# Patient Record
Sex: FEMALE | Race: WHITE | NOT HISPANIC OR LATINO | Employment: FULL TIME
[De-identification: names, ages, dates, MRNs, and addresses within clinical notes are randomized per-mention and may not be internally consistent; named-entity substitution may affect disease eponyms.]

---

## 2023-01-04 ENCOUNTER — HOSPITAL ENCOUNTER (OUTPATIENT)
Dept: DATA CONVERSION | Age: 28
End: 2023-01-04

## 2023-04-25 ENCOUNTER — LAB (OUTPATIENT)
Dept: LAB | Facility: LAB | Age: 28
End: 2023-04-25
Payer: COMMERCIAL

## 2023-04-25 ENCOUNTER — OFFICE VISIT (OUTPATIENT)
Dept: PRIMARY CARE | Facility: CLINIC | Age: 28
End: 2023-04-25
Payer: COMMERCIAL

## 2023-04-25 VITALS
DIASTOLIC BLOOD PRESSURE: 88 MMHG | BODY MASS INDEX: 20.77 KG/M2 | TEMPERATURE: 97.5 F | WEIGHT: 121 LBS | SYSTOLIC BLOOD PRESSURE: 128 MMHG

## 2023-04-25 DIAGNOSIS — F41.9 ANXIETY: ICD-10-CM

## 2023-04-25 DIAGNOSIS — M25.50 ARTHRALGIA, UNSPECIFIED JOINT: ICD-10-CM

## 2023-04-25 DIAGNOSIS — R59.0 ENLARGED LYMPH NODE IN NECK: Primary | ICD-10-CM

## 2023-04-25 DIAGNOSIS — R59.0 ENLARGED LYMPH NODE IN NECK: ICD-10-CM

## 2023-04-25 LAB
BASOPHILS (10*3/UL) IN BLOOD BY AUTOMATED COUNT: 0.02 X10E9/L (ref 0–0.1)
BASOPHILS/100 LEUKOCYTES IN BLOOD BY AUTOMATED COUNT: 0.5 % (ref 0–2)
EOSINOPHILS (10*3/UL) IN BLOOD BY AUTOMATED COUNT: 0.06 X10E9/L (ref 0–0.7)
EOSINOPHILS/100 LEUKOCYTES IN BLOOD BY AUTOMATED COUNT: 1.5 % (ref 0–6)
ERYTHROCYTE DISTRIBUTION WIDTH (RATIO) BY AUTOMATED COUNT: 12.6 % (ref 11.5–14.5)
ERYTHROCYTE MEAN CORPUSCULAR HEMOGLOBIN CONCENTRATION (G/DL) BY AUTOMATED: 31.9 G/DL (ref 32–36)
ERYTHROCYTE MEAN CORPUSCULAR VOLUME (FL) BY AUTOMATED COUNT: 94 FL (ref 80–100)
ERYTHROCYTES (10*6/UL) IN BLOOD BY AUTOMATED COUNT: 4.54 X10E12/L (ref 4–5.2)
HEMATOCRIT (%) IN BLOOD BY AUTOMATED COUNT: 42.7 % (ref 36–46)
HEMOGLOBIN (G/DL) IN BLOOD: 13.6 G/DL (ref 12–16)
IMMATURE GRANULOCYTES/100 LEUKOCYTES IN BLOOD BY AUTOMATED COUNT: 0.2 % (ref 0–0.9)
LEUKOCYTES (10*3/UL) IN BLOOD BY AUTOMATED COUNT: 4.1 X10E9/L (ref 4.4–11.3)
LYMPHOCYTES (10*3/UL) IN BLOOD BY AUTOMATED COUNT: 1.66 X10E9/L (ref 1.2–4.8)
LYMPHOCYTES/100 LEUKOCYTES IN BLOOD BY AUTOMATED COUNT: 40.9 % (ref 13–44)
MONOCYTES (10*3/UL) IN BLOOD BY AUTOMATED COUNT: 0.22 X10E9/L (ref 0.1–1)
MONOCYTES/100 LEUKOCYTES IN BLOOD BY AUTOMATED COUNT: 5.4 % (ref 2–10)
NEUTROPHILS (10*3/UL) IN BLOOD BY AUTOMATED COUNT: 2.09 X10E9/L (ref 1.2–7.7)
NEUTROPHILS/100 LEUKOCYTES IN BLOOD BY AUTOMATED COUNT: 51.5 % (ref 40–80)
PLATELETS (10*3/UL) IN BLOOD AUTOMATED COUNT: 192 X10E9/L (ref 150–450)
RHEUMATOID FACTOR (IU/ML) IN SERUM OR PLASMA: <10 IU/ML (ref 0–15)

## 2023-04-25 PROCEDURE — 86038 ANTINUCLEAR ANTIBODIES: CPT

## 2023-04-25 PROCEDURE — 86225 DNA ANTIBODY NATIVE: CPT

## 2023-04-25 PROCEDURE — 86039 ANTINUCLEAR ANTIBODIES (ANA): CPT

## 2023-04-25 PROCEDURE — 36415 COLL VENOUS BLD VENIPUNCTURE: CPT

## 2023-04-25 PROCEDURE — 99214 OFFICE O/P EST MOD 30 MIN: CPT | Performed by: FAMILY MEDICINE

## 2023-04-25 PROCEDURE — 86431 RHEUMATOID FACTOR QUANT: CPT

## 2023-04-25 PROCEDURE — 85025 COMPLETE CBC W/AUTO DIFF WBC: CPT

## 2023-04-25 PROCEDURE — 86235 NUCLEAR ANTIGEN ANTIBODY: CPT

## 2023-04-25 RX ORDER — HYDROXYZINE HYDROCHLORIDE 10 MG/1
10 TABLET, FILM COATED ORAL NIGHTLY
COMMUNITY
Start: 2023-04-11 | End: 2024-04-08 | Stop reason: ALTCHOICE

## 2023-04-25 ASSESSMENT — PATIENT HEALTH QUESTIONNAIRE - PHQ9
SUM OF ALL RESPONSES TO PHQ9 QUESTIONS 1 AND 2: 2
1. LITTLE INTEREST OR PLEASURE IN DOING THINGS: SEVERAL DAYS
2. FEELING DOWN, DEPRESSED OR HOPELESS: SEVERAL DAYS

## 2023-04-25 ASSESSMENT — ENCOUNTER SYMPTOMS
ARTHRALGIAS: 1
NERVOUS/ANXIOUS: 1
FATIGUE: 1
FEVER: 1

## 2023-04-25 NOTE — PATIENT INSTRUCTIONS
[facility] - [place] OFFICE  [address], [place] [address] [phone]  8:30am - 8:00pm M-  8:30am - 5:00pm F
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
## 2023-04-26 LAB
ANA PATTERN: ABNORMAL
ANA TITER: ABNORMAL
ANTI-CENTROMERE: <0.2 AI
ANTI-CHROMATIN: <0.2 AI
ANTI-DNA (DS): 1 IU/ML
ANTI-JO-1 IGG: <0.2 AI
ANTI-NUCLEAR ANTIBODY (ANA): POSITIVE
ANTI-RIBOSOMAL P: <0.2 AI
ANTI-RNP: <0.2 AI
ANTI-SCL-70: <0.2 AI
ANTI-SM/RNP: <0.2 AI
ANTI-SM: <0.2 AI
ANTI-SSA: <0.2 AI
ANTI-SSB: <0.2 AI

## 2023-04-28 ENCOUNTER — TELEPHONE (OUTPATIENT)
Dept: PRIMARY CARE | Facility: CLINIC | Age: 28
End: 2023-04-28
Payer: COMMERCIAL

## 2023-04-28 NOTE — TELEPHONE ENCOUNTER
Please contact Kiah and let her know her lupus screen was positive, but the more specific tests were all negative, I am happy to place a referral to rheumatology, but think we can monitor and discuss at her follow up appt in 2 weeks,    Thank you,  Traci Huizar, DO

## 2023-05-09 ENCOUNTER — OFFICE VISIT (OUTPATIENT)
Dept: PRIMARY CARE | Facility: CLINIC | Age: 28
End: 2023-05-09
Payer: COMMERCIAL

## 2023-05-09 VITALS
DIASTOLIC BLOOD PRESSURE: 58 MMHG | BODY MASS INDEX: 19.57 KG/M2 | TEMPERATURE: 96.2 F | WEIGHT: 114 LBS | SYSTOLIC BLOOD PRESSURE: 112 MMHG

## 2023-05-09 DIAGNOSIS — M25.50 ARTHRALGIA, UNSPECIFIED JOINT: Primary | ICD-10-CM

## 2023-05-09 DIAGNOSIS — F41.9 ANXIETY: ICD-10-CM

## 2023-05-09 PROCEDURE — 99213 OFFICE O/P EST LOW 20 MIN: CPT | Performed by: FAMILY MEDICINE

## 2023-05-09 PROCEDURE — 1036F TOBACCO NON-USER: CPT | Performed by: FAMILY MEDICINE

## 2023-05-09 ASSESSMENT — ENCOUNTER SYMPTOMS
CONSTITUTIONAL NEGATIVE: 1
NERVOUS/ANXIOUS: 1
ARTHRALGIAS: 1
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
## 2023-06-26 ENCOUNTER — APPOINTMENT (OUTPATIENT)
Dept: PRIMARY CARE | Facility: CLINIC | Age: 28
End: 2023-06-26
Payer: COMMERCIAL

## 2023-06-28 ENCOUNTER — APPOINTMENT (OUTPATIENT)
Dept: PRIMARY CARE | Facility: CLINIC | Age: 28
End: 2023-06-28
Payer: COMMERCIAL

## 2023-06-28 ENCOUNTER — OFFICE VISIT (OUTPATIENT)
Dept: PRIMARY CARE | Facility: CLINIC | Age: 28
End: 2023-06-28
Payer: COMMERCIAL

## 2023-06-28 VITALS
HEART RATE: 117 BPM | SYSTOLIC BLOOD PRESSURE: 112 MMHG | WEIGHT: 123 LBS | BODY MASS INDEX: 21 KG/M2 | OXYGEN SATURATION: 96 % | HEIGHT: 64 IN | DIASTOLIC BLOOD PRESSURE: 82 MMHG | TEMPERATURE: 98 F

## 2023-06-28 DIAGNOSIS — T40.411A ACCIDENTAL FENTANYL OVERDOSE, INITIAL ENCOUNTER (MULTI): ICD-10-CM

## 2023-06-28 DIAGNOSIS — J69.0: Primary | ICD-10-CM

## 2023-06-28 PROBLEM — F11.90 OPIOID USE DISORDER: Status: ACTIVE | Noted: 2023-06-28

## 2023-06-28 PROCEDURE — 1036F TOBACCO NON-USER: CPT | Performed by: FAMILY MEDICINE

## 2023-06-28 PROCEDURE — 99204 OFFICE O/P NEW MOD 45 MIN: CPT | Performed by: FAMILY MEDICINE

## 2023-06-28 RX ORDER — HYDROXYZINE HYDROCHLORIDE 10 MG/1
10 TABLET, FILM COATED ORAL NIGHTLY
COMMUNITY
Start: 2023-06-05

## 2023-06-28 RX ORDER — BENZONATATE 100 MG/1
CAPSULE ORAL
COMMUNITY
Start: 2023-06-24 | End: 2023-06-28 | Stop reason: SDUPTHER

## 2023-06-28 RX ORDER — MIRTAZAPINE 7.5 MG/1
7.5 TABLET, FILM COATED ORAL NIGHTLY
COMMUNITY
Start: 2023-06-24

## 2023-06-28 RX ORDER — BENZONATATE 100 MG/1
CAPSULE ORAL
Qty: 30 CAPSULE | Refills: 0 | Status: SHIPPED | OUTPATIENT
Start: 2023-06-28 | End: 2024-04-08 | Stop reason: WASHOUT

## 2023-06-28 RX ORDER — AMOXICILLIN AND CLAVULANATE POTASSIUM 875; 125 MG/1; MG/1
1 TABLET, FILM COATED ORAL EVERY 12 HOURS
COMMUNITY
Start: 2023-06-24 | End: 2024-04-08 | Stop reason: ALTCHOICE

## 2023-06-28 ASSESSMENT — PAIN SCALES - GENERAL: PAINLEVEL: 0-NO PAIN

## 2023-06-28 NOTE — PROGRESS NOTES
"Subjective   Patient ID: Kiah Oliveros is a 28 y.o. female who presents for Follow-up (Follow up from hospital stay.).    HPI:  Fentanyl overdose-  Patient presented in the ER on Wednesday 21st. Patient states she has a mistakenly overdose with fentanyl. Patient's boyfriend stated she was not breathing and did not have a pulse. Boyfriend had to perform CPR for 5-10mins. Once ambulance arrived patient received 2 doses of narcan and transported to the hospital.   Patient stated used heroin for 2 months, was clean for 2 month prior to the overdose. She states only using heroin and only snorts. She lived with boyfriend for a year and moved out but now lives alone until she had her episode on Wednesday. She feels safe at home.  Patient still has dry cough for which she uses tessalon peals.  No fam hx of cancer, DM, HTN, drug use, heart conditions   No surgeries   Allergies doxycycline - after 4 days gets fever and joint pain   Never smoked, no alcohol use  Sexual active with 4 men and always uses protection. Patient is not on any form of contraception and does not want to discuss any today.    2. She had an multinodular bilateral pneumonia, predominantly basilar, suggesting non-acute aspiration      Medications reviewed and reconciled. Has Narcan from admission         Review of Systems:   No fever   No  chills, diaphoresis,   No rhinorrhea, lacrimation, sneezing,   No palpitations  +cough dry, in evening   No nausea, GI cramping, diarrhea,   No tremulousness      No symptoms suggestive of intoxication or sedation.        Objective   /82 (BP Location: Right arm, Patient Position: Sitting)   Pulse (!) 117   Temp 36.7 °C (98 °F) (Temporal)   Ht 1.626 m (5' 4\")   Wt 55.8 kg (123 lb)   SpO2 96%   BMI 21.11 kg/m²     Physical exam:  Well groomed, comfortable at rest.   Anxious, looks to partner   BP: 112/78,  reg   Eyes: TYRONE, normal pupils  ENT: No coryza  CV: Normal heart sounds, RRR  Lungs clear, no " wheeze or crackles  Fair AE; dry cough   Neuro: Tone, power symmetric, no tremor  Integuement: No diaphoresis, no rash,   Extremities: no leg edema      Assessment/Plan     Accidental fentanyl overdose  Opioid use disorder   Patient transported to the ED and admitted to the hospital 2/2 to fentanyl overdose on 6/21   -Patient offered counseling services for OUD   -Discussed Vivitrol and buprenorphine/naloxone as treatment options- patient will consider    -Narcan provided in the hospital       Aspiration pneumonia of both lower lobes due to vomit   Patient admitted to the hospital 2/2 to fentanyl over dose and aspiration pneumonia    -Reassurance was provided regarding pneumonia. No sign/symptoms of active infection.  Plan to confirm resolution at next visit       RTC in 4 weeks. Note written by Marce Warner and Michelle

## 2023-06-28 NOTE — PATIENT INSTRUCTIONS
Thanks for coming to the [facility] today.   The issues we addressed were:    Fentanyl overdose-   When you decide on a treatment program for support, call the one you prefer.  We suggest [facility] [phone] or the [facility], [phone].    If you would like to use some support medications, Vivitrol or buprenorphine/naloxone would both be good choices. We can prescribe the bup/naloxone here, or you can arrange either at the treatment program.    Pneumonia- has been cured by your medication, and by no longer using fentanyl/heroin.   Please call or go to the Emergency if those symptoms return ( cough, fever) because it's possible to get it twice.     Please see Dr. Martinez within 4 weeks for your health maintenance visit.     If you are sick at any time and need to be seen, contact us for advice.  Please use [facility] when you can- it works.

## 2023-07-03 ENCOUNTER — APPOINTMENT (OUTPATIENT)
Dept: PRIMARY CARE | Facility: CLINIC | Age: 28
End: 2023-07-03
Payer: COMMERCIAL

## 2024-04-04 NOTE — PROGRESS NOTES
Rheumatology Outpatient Follow Up Note    Subjective   Kiah Oliveros is a 29 y.o. female presenting today for Joint Pain.    History of Presenting Problem:   Rheum history:  She was referred to us by PCP for positive YVROSE. It was initially checked because she had recurrent episodes of fever (max ~100F ), fatigue, headache and joint pain(hands, wrists, shoulders, back, knees) but no swelling started in November 2022. Her episodes are not associated with skin rashes and no oral ulcers. No sore throat. No conjunctivitis. She also denies, malar rash, photosensitivity, skin rashes, dry eyes, dry mouth, seizures, h/o blood clots, h/o pericardial or pleural effusion, cold sensitivity in fingers, change in fingers colors when exposed to extreme temperatures, numbness, tingling or muscle weakness.  She has posterior cervical LAP that was first noted in 1/2023 and was seen by ENT who reassured her.   Reviewed labs 4/2023: YVROSE 1:160 speckled, CANDACE negative. RF negative. CBC with WBC 4.1 otherwise unremarkable. BMP normal in 4/2023     Interval history: She didn't have any recent flare ups since last visit. She has been have having more hand pain and stiffness, fatigue, hair loss and dryness of her eyes. She still thinks that her neck (posterior) lymph node is still enlarged but no painful. She overall doesn't feel like normal but also improving now.  No skin rashes. No oral ulcers and no fever episodes.    Past Medical History: History reviewed. No pertinent past medical history.    Allergies:   Allergies   Allergen Reactions    Doxycycline Fever    Lexapro [Escitalopram Oxalate] Fever       Medications:   Current Outpatient Medications:     benzonatate (Tessalon) 100 mg capsule, TAKE 1 CAPSULE BY MOUTH 3 TIMES A DAY AS NEEDED FOR COUGH, Disp: 30 capsule, Rfl: 0    hydrOXYzine HCL (Atarax) 10 mg tablet, Take 1 tablet (10 mg) by mouth once daily at bedtime., Disp: , Rfl:     mirtazapine (Remeron) 7.5 mg tablet, Take 1 tablet  "(7.5 mg) by mouth once daily at bedtime., Disp: , Rfl:     Review of Systems:   Constitutional: Denies fever, chills   Eyes: Denies dry eyes, pain in the eyes   ENT: Denies dry mouth, loss of taste, sores in the mouth  Cardiovascular: Denies chest pain, palpitations   Respiratory: Denies shortness of breath   Gastrointestinal: Denies heartburn   Integumentary: Denies photosensitivity, rash or lesions, Raynaud's   Neurological: Denies any numbness or tingling    MSK: As per HPI.     All 10 review of systems have been reviewed and are negative for complaint except as noted in the HPI    Objective   Physical Examination:  Vitals:    04/08/24 0823   BP: 109/78   Pulse: 89   Temp: 36.8 °C (98.2 °F)     Growth %ile SmartLinks can only be used for patients less than 20 years old.  Ht Readings from Last 1 Encounters:   04/08/24 1.626 m (5' 4\")     Wt Readings from Last 1 Encounters:   04/08/24 59 kg (130 lb)       General - NAD, sitting up in chair, well-groomed, pleasant, AAOx3  Head: Normocephalic, atraumatic  Eyes - PERRLA, EOMI. No conjunctiva injection.   Cardiovascular - Normal S1, S2. Regular rate and rhythm. No murmurs or rubs.  Lungs - Symmetric chest expansion. Clear to auscultation bilaterally.   Skin - No rashes or ulcers. Skin warm and dry. No erythema on bilateral cheeks.  Extremities - No edema, cyanosis ,or clubbing  Neurological - Alert and oriented x 3,  grossly intact. No focal deficit.  Musculoskeletal -  Shoulders: Full ROM, without pain, no swelling, warmth or tenderness.  Elbows: Full ROM, without pain, no swelling, warmth or tenderness.  Wrists: Full ROM, without pain, no swelling, warmth or tenderness.  MCP: No swelling, warmth or tenderness. Metacarpal squeeze negative  PIP: No swelling, warmth or tenderness.  DIP: No swelling, warmth or tenderness.  Hands : 5/5.    Sacroiliac joints: No local tenderness. XUAN negative.   Hips: Full ROM.  No malalignment.  Knees:  Full ROM, without pain, no " swelling, warmth or point tenderness.   Ankles: Full ROM, without pain, swelling, warmth or tenderness.  Toes:  Metatarsal squeeze negative  Cervical spine: No tenderness or limitation of movement  Lumbar spine: No tenderness or limitation of movement          Assessment/Plan   Kiah Oliveros is a 29 y.o. female with PMHx of depression and cervical reactive lymphadenopathy who is presenting today for follow up:     She had recurrent episodes of fever (max ~100F ), fatigue, headache and joint pain(hands, wrists, shoulders, back, knees) but no joint swelling. No sore throat and no mouth sores. She has 1 posterior cervical LAP but no diffuse LAP. Her exam was normal. Reviewed labs 4/2023: YVROSE 1:160 speckled, CANDACE negative. RF negative. CBC with WBC 4.1 otherwise unremarkable. BMP normal in 4/2023     Its unclear why she had but her fevers were low grade and not associated with oral ulcers, sore throat, conjunctivitis, LAP or other features of autoinflammatory febrile illnesses. She now has more hand stiffness, fatigue, hair loss and dry eyes. I am still considering the diagnosis of SLE/UCTD in my differential but would like to evaluate for other etiologies. I will recheck some of her serologies and refer to hematology for their advice of LAP.  Considering HCQ trial at next visit. RTC 2 months.    The assessment and plan, risk and benefits were discussed with the patient. All of the patients questions were answered and patient agrees to the plan.      Elia Lujan MD  Clinical   Department of Rheumatology   [facility]

## 2024-04-08 ENCOUNTER — LAB (OUTPATIENT)
Dept: LAB | Facility: LAB | Age: 29
End: 2024-04-08
Payer: COMMERCIAL

## 2024-04-08 ENCOUNTER — OFFICE VISIT (OUTPATIENT)
Dept: RHEUMATOLOGY | Facility: CLINIC | Age: 29
End: 2024-04-08
Payer: COMMERCIAL

## 2024-04-08 VITALS
HEIGHT: 64 IN | DIASTOLIC BLOOD PRESSURE: 78 MMHG | TEMPERATURE: 98.2 F | WEIGHT: 130 LBS | HEART RATE: 89 BPM | SYSTOLIC BLOOD PRESSURE: 109 MMHG | BODY MASS INDEX: 22.2 KG/M2

## 2024-04-08 DIAGNOSIS — R76.8 POSITIVE ANA (ANTINUCLEAR ANTIBODY): ICD-10-CM

## 2024-04-08 DIAGNOSIS — R59.0 REACTIVE CERVICAL LYMPHADENOPATHY: ICD-10-CM

## 2024-04-08 DIAGNOSIS — R76.8 POSITIVE ANA (ANTINUCLEAR ANTIBODY): Primary | ICD-10-CM

## 2024-04-08 LAB
ALBUMIN SERPL BCP-MCNC: 4.4 G/DL (ref 3.4–5)
ALP SERPL-CCNC: 61 U/L (ref 33–110)
ALT SERPL W P-5'-P-CCNC: 12 U/L (ref 7–45)
ANION GAP SERPL CALC-SCNC: 12 MMOL/L (ref 10–20)
APPEARANCE UR: CLEAR
AST SERPL W P-5'-P-CCNC: 15 U/L (ref 9–39)
B2 GLYCOPROT1 IGA SER-ACNC: <0.6 U/ML
B2 GLYCOPROT1 IGG SER-ACNC: <1.4 U/ML
B2 GLYCOPROT1 IGM SER-ACNC: 0.4 U/ML
BASOPHILS # BLD AUTO: 0.02 X10*3/UL (ref 0–0.1)
BASOPHILS NFR BLD AUTO: 0.5 %
BILIRUB SERPL-MCNC: 0.5 MG/DL (ref 0–1.2)
BILIRUB UR STRIP.AUTO-MCNC: NEGATIVE MG/DL
BUN SERPL-MCNC: 12 MG/DL (ref 6–23)
C3 SERPL-MCNC: 129 MG/DL (ref 87–200)
C4 SERPL-MCNC: 36 MG/DL (ref 10–50)
CALCIUM SERPL-MCNC: 10.3 MG/DL (ref 8.6–10.6)
CARDIOLIPIN IGA SERPL-ACNC: 0.6 APL U/ML
CARDIOLIPIN IGG SER IA-ACNC: <1.6 GPL U/ML
CARDIOLIPIN IGM SER IA-ACNC: 0.3 MPL U/ML
CHLORIDE SERPL-SCNC: 103 MMOL/L (ref 98–107)
CO2 SERPL-SCNC: 29 MMOL/L (ref 21–32)
COLOR UR: YELLOW
CREAT SERPL-MCNC: 0.6 MG/DL (ref 0.5–1.05)
CREAT UR-MCNC: 178.5 MG/DL (ref 20–320)
CRP SERPL-MCNC: <0.1 MG/DL
EGFRCR SERPLBLD CKD-EPI 2021: >90 ML/MIN/1.73M*2
EOSINOPHIL # BLD AUTO: 0.06 X10*3/UL (ref 0–0.7)
EOSINOPHIL NFR BLD AUTO: 1.5 %
ERYTHROCYTE [DISTWIDTH] IN BLOOD BY AUTOMATED COUNT: 12.1 % (ref 11.5–14.5)
ERYTHROCYTE [SEDIMENTATION RATE] IN BLOOD BY WESTERGREN METHOD: 3 MM/H (ref 0–20)
GLUCOSE SERPL-MCNC: 98 MG/DL (ref 74–99)
GLUCOSE UR STRIP.AUTO-MCNC: NORMAL MG/DL
HCT VFR BLD AUTO: 39.6 % (ref 36–46)
HGB BLD-MCNC: 12.5 G/DL (ref 12–16)
IGG4 SER-MCNC: 7 MG/DL (ref 3–200)
IMM GRANULOCYTES # BLD AUTO: 0 X10*3/UL (ref 0–0.7)
IMM GRANULOCYTES NFR BLD AUTO: 0 % (ref 0–0.9)
KETONES UR STRIP.AUTO-MCNC: NEGATIVE MG/DL
LEUKOCYTE ESTERASE UR QL STRIP.AUTO: NEGATIVE
LYMPHOCYTES # BLD AUTO: 1.95 X10*3/UL (ref 1.2–4.8)
LYMPHOCYTES NFR BLD AUTO: 48.8 %
MCH RBC QN AUTO: 29.6 PG (ref 26–34)
MCHC RBC AUTO-ENTMCNC: 31.6 G/DL (ref 32–36)
MCV RBC AUTO: 94 FL (ref 80–100)
MONOCYTES # BLD AUTO: 0.23 X10*3/UL (ref 0.1–1)
MONOCYTES NFR BLD AUTO: 5.8 %
NEUTROPHILS # BLD AUTO: 1.74 X10*3/UL (ref 1.2–7.7)
NEUTROPHILS NFR BLD AUTO: 43.4 %
NITRITE UR QL STRIP.AUTO: NEGATIVE
NRBC BLD-RTO: 0 /100 WBCS (ref 0–0)
PH UR STRIP.AUTO: 5.5 [PH]
PLATELET # BLD AUTO: 203 X10*3/UL (ref 150–450)
POTASSIUM SERPL-SCNC: 4.3 MMOL/L (ref 3.5–5.3)
PROT SERPL-MCNC: 7.1 G/DL (ref 6.4–8.2)
PROT UR STRIP.AUTO-MCNC: NEGATIVE MG/DL
PROT UR-ACNC: 9 MG/DL (ref 5–24)
PROT/CREAT UR: 0.05 MG/MG CREAT (ref 0–0.17)
RBC # BLD AUTO: 4.22 X10*6/UL (ref 4–5.2)
RBC # UR STRIP.AUTO: NEGATIVE /UL
SODIUM SERPL-SCNC: 140 MMOL/L (ref 136–145)
SP GR UR STRIP.AUTO: 1.02
UROBILINOGEN UR STRIP.AUTO-MCNC: NORMAL MG/DL
WBC # BLD AUTO: 4 X10*3/UL (ref 4.4–11.3)

## 2024-04-08 PROCEDURE — 85730 THROMBOPLASTIN TIME PARTIAL: CPT

## 2024-04-08 PROCEDURE — 86160 COMPLEMENT ANTIGEN: CPT

## 2024-04-08 PROCEDURE — 80053 COMPREHEN METABOLIC PANEL: CPT

## 2024-04-08 PROCEDURE — 81003 URINALYSIS AUTO W/O SCOPE: CPT

## 2024-04-08 PROCEDURE — 86146 BETA-2 GLYCOPROTEIN ANTIBODY: CPT

## 2024-04-08 PROCEDURE — 36415 COLL VENOUS BLD VENIPUNCTURE: CPT

## 2024-04-08 PROCEDURE — 1036F TOBACCO NON-USER: CPT | Performed by: STUDENT IN AN ORGANIZED HEALTH CARE EDUCATION/TRAINING PROGRAM

## 2024-04-08 PROCEDURE — 99214 OFFICE O/P EST MOD 30 MIN: CPT | Performed by: STUDENT IN AN ORGANIZED HEALTH CARE EDUCATION/TRAINING PROGRAM

## 2024-04-08 PROCEDURE — 86147 CARDIOLIPIN ANTIBODY EA IG: CPT

## 2024-04-08 PROCEDURE — 84156 ASSAY OF PROTEIN URINE: CPT

## 2024-04-08 PROCEDURE — 85652 RBC SED RATE AUTOMATED: CPT

## 2024-04-08 PROCEDURE — 82570 ASSAY OF URINE CREATININE: CPT

## 2024-04-08 PROCEDURE — 85613 RUSSELL VIPER VENOM DILUTED: CPT

## 2024-04-08 PROCEDURE — 85025 COMPLETE CBC W/AUTO DIFF WBC: CPT

## 2024-04-08 PROCEDURE — 86140 C-REACTIVE PROTEIN: CPT

## 2024-04-08 PROCEDURE — 82787 IGG 1 2 3 OR 4 EACH: CPT

## 2024-04-08 PROCEDURE — 83529 ASAY OF INTERLEUKIN-6 (IL-6): CPT

## 2024-04-11 LAB
DRVVT SCREEN TO CONFIRM RATIO: 1.06 RATIO
DRVVT/DRVVT CFM NRMLZD PPP-RTO: 1.06 RATIO
DRVVT/DRVVT CFM P DOAC NEUT NORM PPP-RTO: 0.99 RATIO
IL6 SERPL-MCNC: <2 PG/ML
LA 2 SCREEN W REFLEX-IMP: NORMAL
NORMALIZED SCT PPP-RTO: 0.94 RATIO
SILICA CLOTTING TIME CONFIRMATION: 1.05 RATIO
SILICA CLOTTING TIME SCREEN: 0.99 RATIO

## 2024-05-06 ENCOUNTER — APPOINTMENT (OUTPATIENT)
Dept: HEMATOLOGY/ONCOLOGY | Facility: CLINIC | Age: 29
End: 2024-05-06
Payer: COMMERCIAL

## 2024-06-05 NOTE — PROGRESS NOTES
Rheumatology Outpatient Follow Up Note    Subjective   Kiah Oliveros is a 29 y.o. female presenting today for Abnormal Lab.    History of Presenting Problem:   Rheum history:  She was referred to us by PCP for positive YVROSE. It was initially checked because she had recurrent episodes of fever (max ~100F ), fatigue, headache and joint pain(hands, wrists, shoulders, back, knees) but no swelling started in November 2022. Her episodes are not associated with skin rashes and no oral ulcers. No sore throat. No conjunctivitis. She also denies, malar rash, photosensitivity, skin rashes, dry eyes, dry mouth, seizures, h/o blood clots, h/o pericardial or pleural effusion, cold sensitivity in fingers, change in fingers colors when exposed to extreme temperatures, numbness, tingling or muscle weakness.  She has posterior cervical LAP that was first noted in 1/2023 and was seen by ENT who reassured her.   Reviewed labs 4/2023: YVROSE 1:160 speckled, CANDACE negative. RF negative. CBC with WBC 4.1 otherwise unremarkable. BMP normal in 4/2023     Interval history: But she has been have having more hand pain and stiffness in the morning, she has worsening fatigue, hair loss and dryness of her eyes. She still thinks that her neck (posterior) lymph node gets enlarged but not painful during these episodes..   No skin rashes. No oral ulcers and no fever episodes.    Past Medical History: No past medical history on file.    Allergies:   Allergies   Allergen Reactions    Doxycycline Fever    Lexapro [Escitalopram Oxalate] Fever       Medications:   Current Outpatient Medications:     hydrOXYzine HCL (Atarax) 10 mg tablet, Take 1 tablet (10 mg) by mouth once daily at bedtime., Disp: , Rfl:     mirtazapine (Remeron) 7.5 mg tablet, Take 1 tablet (7.5 mg) by mouth once daily at bedtime., Disp: , Rfl:     Review of Systems:   Constitutional: Denies fever, chills   Eyes: Denies dry eyes, pain in the eyes   ENT: Denies dry mouth, loss of taste,  "sores in the mouth  Cardiovascular: Denies chest pain, palpitations   Respiratory: Denies shortness of breath   Gastrointestinal: Denies heartburn   Integumentary: Denies photosensitivity, rash or lesions, Raynaud's   Neurological: Denies any numbness or tingling    MSK: As per HPI.     All 10 review of systems have been reviewed and are negative for complaint except as noted in the HPI    Objective   Physical Examination:  Vitals:    06/10/24 0844   BP: 125/81   Pulse: 99   Temp: 36.8 °C (98.2 °F)       Growth %ile SmartLinks can only be used for patients less than 20 years old.  Ht Readings from Last 1 Encounters:   04/08/24 1.626 m (5' 4\")     Wt Readings from Last 1 Encounters:   06/10/24 59.4 kg (131 lb)       General - NAD, sitting up in chair, well-groomed, pleasant, AAOx3  Head: Normocephalic, atraumatic  Eyes - PERRLA, EOMI. No conjunctiva injection.   Cardiovascular - Normal S1, S2. Regular rate and rhythm. No murmurs or rubs.  Lungs - Symmetric chest expansion. Clear to auscultation bilaterally.   Skin - No rashes or ulcers. Skin warm and dry. No erythema on bilateral cheeks.  Extremities - No edema, cyanosis ,or clubbing  Neurological - Alert and oriented x 3,  grossly intact. No focal deficit.  Musculoskeletal -  Shoulders: Full ROM, without pain, no swelling, warmth or tenderness.  Elbows: Full ROM, without pain, no swelling, warmth or tenderness.  Wrists: Full ROM, without pain, no swelling, warmth or tenderness.  MCP: No swelling, warmth or tenderness. Metacarpal squeeze negative  PIP: No swelling, warmth or tenderness.  DIP: No swelling, warmth or tenderness.  Hands : 5/5.    Sacroiliac joints: No local tenderness. XUAN negative.   Hips: Full ROM.  No malalignment.  Knees:  Full ROM, without pain, no swelling, warmth or point tenderness.   Ankles: Full ROM, without pain, swelling, warmth or tenderness.  Toes:  Metatarsal squeeze negative  Cervical spine: No tenderness or limitation of " movement  Lumbar spine: No tenderness or limitation of movement          Assessment/Plan   Kiah Oliveros is a 29 y.o. female with PMHx of depression and cervical reactive lymphadenopathy who is presenting today for follow up:     She had recurrent episodes of fever (max ~100F ), fatigue, headache and joint pain(hands, wrists, shoulders, back, knees) but no joint swelling. No sore throat and no mouth sores. She has 1 posterior cervical LAP but no diffuse LAP. Her exam was normal. Reviewed labs 4/2023: YVROSE 1:160 speckled, CANDACE negative. RF negative. CBC with WBC 4.1 otherwise unremarkable. BMP normal in 4/2023     Its unclear why she had but her fevers were low grade and not associated with oral ulcers, sore throat, conjunctivitis, LAP or other features of autoinflammatory febrile illnesses. She now has more hand stiffness, fatigue, hair loss and dry eyes. I am still considering the diagnosis of SLE/UCTD. She had negative IgG4, pr/cr, APLA, ESR/CRP and IL-6. Hematology referral was denied for LAP as she didn't have a tissue diagnosis. .  Considering HCQ trial  for 3-6 months. RTC 3 months.    The assessment and plan, risk and benefits were discussed with the patient. All of the patients questions were answered and patient agrees to the plan.      Elia Lujan MD  Clinical   Department of Rheumatology   [facility]

## 2024-06-10 ENCOUNTER — OFFICE VISIT (OUTPATIENT)
Dept: RHEUMATOLOGY | Facility: CLINIC | Age: 29
End: 2024-06-10
Payer: COMMERCIAL

## 2024-06-10 VITALS
HEART RATE: 99 BPM | WEIGHT: 131 LBS | BODY MASS INDEX: 22.49 KG/M2 | SYSTOLIC BLOOD PRESSURE: 125 MMHG | DIASTOLIC BLOOD PRESSURE: 81 MMHG | TEMPERATURE: 98.2 F

## 2024-06-10 DIAGNOSIS — Z79.899 LONG-TERM USE OF HYDROXYCHLOROQUINE: ICD-10-CM

## 2024-06-10 DIAGNOSIS — M35.9 UNDIFFERENTIATED CONNECTIVE TISSUE DISEASE (MULTI): ICD-10-CM

## 2024-06-10 DIAGNOSIS — R76.8 POSITIVE ANA (ANTINUCLEAR ANTIBODY): Primary | ICD-10-CM

## 2024-06-10 DIAGNOSIS — M25.50 POLYARTHRALGIA: ICD-10-CM

## 2024-06-10 PROCEDURE — 99214 OFFICE O/P EST MOD 30 MIN: CPT | Performed by: STUDENT IN AN ORGANIZED HEALTH CARE EDUCATION/TRAINING PROGRAM

## 2024-06-10 PROCEDURE — 1036F TOBACCO NON-USER: CPT | Performed by: STUDENT IN AN ORGANIZED HEALTH CARE EDUCATION/TRAINING PROGRAM

## 2024-06-10 RX ORDER — HYDROXYCHLOROQUINE SULFATE 200 MG/1
300 TABLET, FILM COATED ORAL DAILY
Qty: 45 TABLET | Refills: 2 | Status: SHIPPED | OUTPATIENT
Start: 2024-06-10

## 2024-09-01 DIAGNOSIS — Z79.899 LONG-TERM USE OF HYDROXYCHLOROQUINE: ICD-10-CM

## 2024-09-01 DIAGNOSIS — M25.50 POLYARTHRALGIA: ICD-10-CM

## 2024-09-01 DIAGNOSIS — M35.9 UNDIFFERENTIATED CONNECTIVE TISSUE DISEASE (MULTI): ICD-10-CM

## 2024-09-03 RX ORDER — HYDROXYCHLOROQUINE SULFATE 200 MG/1
300 TABLET, FILM COATED ORAL DAILY
Qty: 45 TABLET | Refills: 2 | Status: SHIPPED | OUTPATIENT
Start: 2024-09-03

## 2024-09-06 ENCOUNTER — LAB (OUTPATIENT)
Dept: LAB | Facility: LAB | Age: 29
End: 2024-09-06
Payer: COMMERCIAL

## 2024-09-06 DIAGNOSIS — M25.50 POLYARTHRALGIA: ICD-10-CM

## 2024-09-06 DIAGNOSIS — M35.9 UNDIFFERENTIATED CONNECTIVE TISSUE DISEASE (MULTI): ICD-10-CM

## 2024-09-06 DIAGNOSIS — Z79.899 LONG-TERM USE OF HYDROXYCHLOROQUINE: ICD-10-CM

## 2024-09-06 LAB
ALBUMIN SERPL BCP-MCNC: 4 G/DL (ref 3.4–5)
ALP SERPL-CCNC: 76 U/L (ref 33–110)
ALT SERPL W P-5'-P-CCNC: 9 U/L (ref 7–45)
ANION GAP SERPL CALC-SCNC: 11 MMOL/L (ref 10–20)
AST SERPL W P-5'-P-CCNC: 15 U/L (ref 9–39)
BASOPHILS # BLD AUTO: 0.02 X10*3/UL (ref 0–0.1)
BASOPHILS NFR BLD AUTO: 0.5 %
BILIRUB SERPL-MCNC: 0.3 MG/DL (ref 0–1.2)
BUN SERPL-MCNC: 17 MG/DL (ref 6–23)
CALCIUM SERPL-MCNC: 9.4 MG/DL (ref 8.6–10.6)
CHLORIDE SERPL-SCNC: 103 MMOL/L (ref 98–107)
CK SERPL-CCNC: 123 U/L (ref 0–215)
CO2 SERPL-SCNC: 30 MMOL/L (ref 21–32)
CREAT SERPL-MCNC: 0.63 MG/DL (ref 0.5–1.05)
CRP SERPL-MCNC: 0.31 MG/DL
EGFRCR SERPLBLD CKD-EPI 2021: >90 ML/MIN/1.73M*2
EOSINOPHIL # BLD AUTO: 0.08 X10*3/UL (ref 0–0.7)
EOSINOPHIL NFR BLD AUTO: 1.9 %
ERYTHROCYTE [DISTWIDTH] IN BLOOD BY AUTOMATED COUNT: 12.6 % (ref 11.5–14.5)
ERYTHROCYTE [SEDIMENTATION RATE] IN BLOOD BY WESTERGREN METHOD: 9 MM/H (ref 0–20)
GLUCOSE SERPL-MCNC: 100 MG/DL (ref 74–99)
HCT VFR BLD AUTO: 35.8 % (ref 36–46)
HGB BLD-MCNC: 11.5 G/DL (ref 12–16)
IMM GRANULOCYTES # BLD AUTO: 0.01 X10*3/UL (ref 0–0.7)
IMM GRANULOCYTES NFR BLD AUTO: 0.2 % (ref 0–0.9)
LYMPHOCYTES # BLD AUTO: 2.3 X10*3/UL (ref 1.2–4.8)
LYMPHOCYTES NFR BLD AUTO: 54.2 %
MCH RBC QN AUTO: 29.6 PG (ref 26–34)
MCHC RBC AUTO-ENTMCNC: 32.1 G/DL (ref 32–36)
MCV RBC AUTO: 92 FL (ref 80–100)
MONOCYTES # BLD AUTO: 0.28 X10*3/UL (ref 0.1–1)
MONOCYTES NFR BLD AUTO: 6.6 %
NEUTROPHILS # BLD AUTO: 1.55 X10*3/UL (ref 1.2–7.7)
NEUTROPHILS NFR BLD AUTO: 36.6 %
NRBC BLD-RTO: 0 /100 WBCS (ref 0–0)
PLATELET # BLD AUTO: 169 X10*3/UL (ref 150–450)
POTASSIUM SERPL-SCNC: 4.1 MMOL/L (ref 3.5–5.3)
PROT SERPL-MCNC: 6.5 G/DL (ref 6.4–8.2)
RBC # BLD AUTO: 3.88 X10*6/UL (ref 4–5.2)
SODIUM SERPL-SCNC: 140 MMOL/L (ref 136–145)
WBC # BLD AUTO: 4.2 X10*3/UL (ref 4.4–11.3)

## 2024-09-06 PROCEDURE — 86140 C-REACTIVE PROTEIN: CPT

## 2024-09-06 PROCEDURE — 36415 COLL VENOUS BLD VENIPUNCTURE: CPT

## 2024-09-06 PROCEDURE — 80053 COMPREHEN METABOLIC PANEL: CPT

## 2024-09-06 PROCEDURE — 85652 RBC SED RATE AUTOMATED: CPT

## 2024-09-06 PROCEDURE — 85025 COMPLETE CBC W/AUTO DIFF WBC: CPT

## 2024-09-06 PROCEDURE — 82550 ASSAY OF CK (CPK): CPT

## 2024-09-09 NOTE — PROGRESS NOTES
Rheumatology Outpatient Follow Up Note    Subjective   Kiah Oliveros is a 29 y.o. female presenting today for Joint Pain.    History of Presenting Problem:   Rheum history:  She was referred to us by PCP for positive YVROSE. It was initially checked because she had recurrent episodes of fever (max ~100F ), fatigue, headache and joint pain(hands, wrists, shoulders, back, knees) but no swelling started in November 2022. Her episodes are not associated with skin rashes and no oral ulcers. No sore throat. No conjunctivitis. She also denies, malar rash, photosensitivity, skin rashes, dry eyes, dry mouth, seizures, h/o blood clots, h/o pericardial or pleural effusion, cold sensitivity in fingers, change in fingers colors when exposed to extreme temperatures, numbness, tingling or muscle weakness.  She has posterior cervical LAP that was first noted in 1/2023 and was seen by ENT who reassured her.   Reviewed labs 4/2023: YVROSE 1:160 speckled, CANDACE negative. RF negative. CBC with WBC 4.1 otherwise unremarkable. BMP normal in 4/2023     Interval history: started HCQ and she has less hand pain and stiffness in the morning, she has less fatigue.   No skin rashes. No oral ulcers and no fever episodes.    Past Medical History: History reviewed. No pertinent past medical history.    Allergies:   Allergies   Allergen Reactions    Doxycycline Fever    Lexapro [Escitalopram Oxalate] Fever       Medications:   Current Outpatient Medications:     hydroxychloroquine (Plaquenil) 200 mg tablet, TAKE 1.5 TABLETS (300 MG) BY MOUTH ONCE DAILY., Disp: 45 tablet, Rfl: 2    hydrOXYzine HCL (Atarax) 10 mg tablet, Take 1 tablet (10 mg) by mouth once daily at bedtime., Disp: , Rfl:     mirtazapine (Remeron) 7.5 mg tablet, Take 1 tablet (7.5 mg) by mouth once daily at bedtime., Disp: , Rfl:     Review of Systems:   Constitutional: Denies fever, chills   Eyes: Denies dry eyes, pain in the eyes   ENT: Denies dry mouth, loss of taste, sores in the  "mouth  Cardiovascular: Denies chest pain, palpitations   Respiratory: Denies shortness of breath   Gastrointestinal: Denies heartburn   Integumentary: Denies photosensitivity, rash or lesions, Raynaud's   Neurological: Denies any numbness or tingling    MSK: As per HPI.     All 10 review of systems have been reviewed and are negative for complaint except as noted in the HPI    Objective   Physical Examination:  Vitals:    09/10/24 0847   BP: 112/77   Pulse: 106   Temp: 36.7 °C (98.1 °F)         Growth %ile SmartLinks can only be used for patients less than 20 years old.  Ht Readings from Last 1 Encounters:   09/10/24 1.626 m (5' 4\")     Wt Readings from Last 1 Encounters:   09/10/24 58.5 kg (129 lb)       General - NAD, sitting up in chair, well-groomed, pleasant, AAOx3  Head: Normocephalic, atraumatic  Eyes - PERRLA, EOMI. No conjunctiva injection.   Cardiovascular - Normal S1, S2. Regular rate and rhythm. No murmurs or rubs.  Lungs - Symmetric chest expansion. Clear to auscultation bilaterally.   Skin - No rashes or ulcers. Skin warm and dry. No erythema on bilateral cheeks.  Extremities - No edema, cyanosis ,or clubbing  Neurological - Alert and oriented x 3,  grossly intact. No focal deficit.  Musculoskeletal -  Shoulders: Full ROM, without pain, no swelling, warmth or tenderness.  Elbows: Full ROM, without pain, no swelling, warmth or tenderness.  Wrists: Full ROM, without pain, no swelling, warmth or tenderness.  MCP: No swelling, warmth or tenderness. Metacarpal squeeze negative  PIP: No swelling, warmth or tenderness.  DIP: No swelling, warmth or tenderness.  Hands : 5/5.    Sacroiliac joints: No local tenderness. XUAN negative.   Hips: Full ROM.  No malalignment.  Knees:  Full ROM, without pain, no swelling, warmth or point tenderness.   Ankles: Full ROM, without pain, swelling, warmth or tenderness.  Toes:  Metatarsal squeeze negative  Cervical spine: No tenderness or limitation of movement  Lumbar " spine: No tenderness or limitation of movement          Assessment/Plan   Kiah Oliveros is a 29 y.o. female with PMHx of depression and cervical reactive lymphadenopathy who is presenting today for follow up:     She had recurrent episodes of fever (max ~100F ), fatigue, headache and joint pain(hands, wrists, shoulders, back, knees) but no joint swelling. No sore throat and no mouth sores. She has 1 posterior cervical LAP but no diffuse LAP. Her exam was normal. Reviewed labs 4/2023: YVROSE 1:160 speckled, CANDACE negative. RF negative. CBC with WBC 4.1 otherwise unremarkable. BMP normal in 4/2023     Its unclear why she had but her fevers were low grade and not associated with oral ulcers, sore throat, conjunctivitis, LAP or other features of autoinflammatory febrile illnesses. She now has more hand stiffness, fatigue, hair loss and dry eyes. I am still considering the diagnosis of SLE/UCTD. She had negative IgG4, pr/cr, APLA, ESR/CRP and IL-6. Hematology referral was denied for LAP as she didn't have a tissue diagnosis. .  Started a HCQ trial  6/2024 and it helped. We will continue HCQ for UCTD. Refer to ophthalmology.  Reviewed labs 9/2024: CMP/ESR/CRP unremarkable.  CBC with WBC 4.2, normal diff. Hb 11.5. Will be monitored. RTC 3 months.    The assessment and plan, risk and benefits were discussed with the patient. All of the patients questions were answered and patient agrees to the plan.      Elia Lujan MD  Clinical   Department of Rheumatology   [facility]

## 2024-09-10 ENCOUNTER — APPOINTMENT (OUTPATIENT)
Dept: RHEUMATOLOGY | Facility: CLINIC | Age: 29
End: 2024-09-10
Payer: COMMERCIAL

## 2024-09-10 VITALS
TEMPERATURE: 98.1 F | BODY MASS INDEX: 22.02 KG/M2 | WEIGHT: 129 LBS | DIASTOLIC BLOOD PRESSURE: 77 MMHG | HEIGHT: 64 IN | SYSTOLIC BLOOD PRESSURE: 112 MMHG | HEART RATE: 106 BPM

## 2024-09-10 DIAGNOSIS — M35.9 UNDIFFERENTIATED CONNECTIVE TISSUE DISEASE (MULTI): Primary | ICD-10-CM

## 2024-09-10 DIAGNOSIS — M25.50 POLYARTHRALGIA: ICD-10-CM

## 2024-09-10 DIAGNOSIS — Z79.899 LONG-TERM USE OF HYDROXYCHLOROQUINE: ICD-10-CM

## 2024-09-10 PROCEDURE — 3008F BODY MASS INDEX DOCD: CPT | Performed by: STUDENT IN AN ORGANIZED HEALTH CARE EDUCATION/TRAINING PROGRAM

## 2024-09-10 PROCEDURE — 1036F TOBACCO NON-USER: CPT | Performed by: STUDENT IN AN ORGANIZED HEALTH CARE EDUCATION/TRAINING PROGRAM

## 2024-09-10 PROCEDURE — 99214 OFFICE O/P EST MOD 30 MIN: CPT | Performed by: STUDENT IN AN ORGANIZED HEALTH CARE EDUCATION/TRAINING PROGRAM

## 2024-09-10 RX ORDER — HYDROXYCHLOROQUINE SULFATE 200 MG/1
300 TABLET, FILM COATED ORAL DAILY
Qty: 45 TABLET | Refills: 2 | Status: SHIPPED | OUTPATIENT
Start: 2024-09-10

## 2024-09-10 NOTE — PATIENT INSTRUCTIONS
Hydroxychloroquine counselling:  Educated the patient about the potential side effects of using antimalarials. Serious side effects are extremely rare. However, the fear of vision-threatening toxic retinopathy remains a major concern, this can be avoided when using the appropriate doses along with routine ocular monitoring.

## 2024-12-09 ENCOUNTER — APPOINTMENT (OUTPATIENT)
Dept: OBSTETRICS AND GYNECOLOGY | Facility: CLINIC | Age: 29
End: 2024-12-09
Payer: COMMERCIAL

## 2024-12-09 VITALS
HEIGHT: 64 IN | BODY MASS INDEX: 24.14 KG/M2 | SYSTOLIC BLOOD PRESSURE: 125 MMHG | WEIGHT: 141.38 LBS | DIASTOLIC BLOOD PRESSURE: 87 MMHG

## 2024-12-09 DIAGNOSIS — R39.89 URETHRAL PAIN: ICD-10-CM

## 2024-12-09 PROCEDURE — 1036F TOBACCO NON-USER: CPT | Performed by: ADVANCED PRACTICE MIDWIFE

## 2024-12-09 PROCEDURE — 3008F BODY MASS INDEX DOCD: CPT | Performed by: ADVANCED PRACTICE MIDWIFE

## 2024-12-09 PROCEDURE — 99203 OFFICE O/P NEW LOW 30 MIN: CPT | Performed by: ADVANCED PRACTICE MIDWIFE

## 2024-12-09 NOTE — PROGRESS NOTES
GYN Problem note  Kiah Oliveros  is a 29 y.o. who presents as a new pt with urethral pain x 3 months as well as urgency.   Chief Complaint   Patient presents with    Urinary Symptom       Kiah states the urethral pain started in October. Reports the pain as intermittent, with voiding, and with penetrative intercourse. The pain is isolated to her urethra. Was seen at urgent care last month, urine culture was negative.    Physical Exam   Physical Exam  Constitutional:       Appearance: Normal appearance.   Genitourinary:      Vulva and urethral meatus normal.      Urethral tenderness present.      Urethra exam comments: Internal swelling and tenderness with palpation of the anterior vagina.   HENT:      Head: Normocephalic and atraumatic.   Pulmonary:      Effort: Pulmonary effort is normal.   Musculoskeletal:         General: Normal range of motion.   Neurological:      General: No focal deficit present.      Mental Status: She is alert and oriented to person, place, and time.   Psychiatric:         Mood and Affect: Mood normal.         Behavior: Behavior normal.   Vitals reviewed.          Visit Vitals  /87            Assessment/Plan   Urethral pain/inflammation  Referral to urogyn, pt to schedule    Return to care PRN      YANG Duval-ONESIMO

## 2024-12-09 NOTE — PROGRESS NOTES
Rheumatology Outpatient Follow Up Note    Subjective   Kiah Oliveros is a 29 y.o. female presenting today for Joint Pain.    History of Presenting Problem:   Rheum history:  She was referred to us by PCP for positive YVROSE. It was initially checked because she had recurrent episodes of fever (max ~100F ), fatigue, headache and joint pain(hands, wrists, shoulders, back, knees) but no swelling started in November 2022. Her episodes are not associated with skin rashes and no oral ulcers. No sore throat. No conjunctivitis. She also denies, malar rash, photosensitivity, skin rashes, dry eyes, dry mouth, seizures, h/o blood clots, h/o pericardial or pleural effusion, cold sensitivity in fingers, change in fingers colors when exposed to extreme temperatures, numbness, tingling or muscle weakness.  She has posterior cervical LAP that was first noted in 1/2023 and was seen by ENT who reassured her.   Reviewed labs 4/2023: YVROSE 1:160 speckled, CANDACE negative. RF negative. CBC with WBC 4.1 otherwise unremarkable. BMP normal in 4/2023     Interval history: started HCQ and she has less hand pain and stiffness in the morning, she has less fatigue.   No skin rashes. No oral ulcers and no fever episodes.    Past Medical History: History reviewed. No pertinent past medical history.    Allergies:   Allergies   Allergen Reactions    Doxycycline Fever    Lexapro [Escitalopram Oxalate] Fever       Medications:   Current Outpatient Medications:     hydroxychloroquine (Plaquenil) 200 mg tablet, Take 1.5 tablets (300 mg) by mouth once daily., Disp: 45 tablet, Rfl: 2    hydrOXYzine HCL (Atarax) 10 mg tablet, Take 1 tablet (10 mg) by mouth once daily at bedtime. (Patient not taking: Reported on 12/9/2024), Disp: , Rfl:     mirtazapine (Remeron) 7.5 mg tablet, Take 1 tablet (7.5 mg) by mouth once daily at bedtime. (Patient not taking: Reported on 12/9/2024), Disp: , Rfl:     Review of Systems:   Constitutional: Denies fever, chills   Eyes:  "Denies dry eyes, pain in the eyes   ENT: Denies dry mouth, loss of taste, sores in the mouth  Cardiovascular: Denies chest pain, palpitations   Respiratory: Denies shortness of breath   Gastrointestinal: Denies heartburn   Integumentary: Denies photosensitivity, rash or lesions, Raynaud's   Neurological: Denies any numbness or tingling    MSK: As per HPI.     All 10 review of systems have been reviewed and are negative for complaint except as noted in the HPI    Objective   Physical Examination:  Vitals:    12/10/24 0910   BP: 115/81   Pulse: 98   Temp: 36.7 °C (98.1 °F)           Growth %ile SmartLinks can only be used for patients less than 20 years old.  Ht Readings from Last 1 Encounters:   12/10/24 1.626 m (5' 4\")     Wt Readings from Last 1 Encounters:   12/10/24 63 kg (139 lb)       General - NAD, sitting up in chair, well-groomed, pleasant, AAOx3  Head: Normocephalic, atraumatic  Eyes - PERRLA, EOMI. No conjunctiva injection.   Cardiovascular - Normal S1, S2. Regular rate and rhythm. No murmurs or rubs.  Lungs - Symmetric chest expansion. Clear to auscultation bilaterally.   Skin - No rashes or ulcers. Skin warm and dry. No erythema on bilateral cheeks.  Extremities - No edema, cyanosis ,or clubbing  Neurological - Alert and oriented x 3,  grossly intact. No focal deficit.  Musculoskeletal -  Shoulders: Full ROM, without pain, no swelling, warmth or tenderness.  Elbows: Full ROM, without pain, no swelling, warmth or tenderness.  Wrists: Full ROM, without pain, no swelling, warmth or tenderness.  MCP: No swelling, warmth or tenderness. Metacarpal squeeze negative  PIP: No swelling, warmth or tenderness.  DIP: No swelling, warmth or tenderness.  Hands : 5/5.    Sacroiliac joints: No local tenderness. XUAN negative.   Hips: Full ROM.  No malalignment.  Knees:  Full ROM, without pain, no swelling, warmth or point tenderness.   Ankles: Full ROM, without pain, swelling, warmth or tenderness.  Toes:  " Metatarsal squeeze negative  Cervical spine: No tenderness or limitation of movement  Lumbar spine: No tenderness or limitation of movement          Assessment/Plan   Kiah Oliveros is a 29 y.o. female with PMHx of depression and cervical reactive lymphadenopathy who is presenting today for follow up:     ##UCTD:  She had recurrent episodes of fever (max ~100F ), fatigue, headache and joint pain(hands, wrists, shoulders, back, knees) but no joint swelling. No sore throat and no mouth sores. She has 1 posterior cervical LAP but no diffuse LAP. Her exam was normal. Reviewed labs 4/2023: YVROSE 1:160 speckled, CANDACE negative. RF negative. CBC with WBC 4.1 otherwise unremarkable. BMP normal in 4/2023     Her fevers were low grade and not associated with oral ulcers, sore throat, conjunctivitis, LAP or other features of autoinflammatory febrile illnesses. She now has more hand stiffness, fatigue, hair loss and dry eyes. I am still considering the diagnosis of SLE/UCTD. She had negative IgG4, pr/cr, APLA, ESR/CRP and IL-6. Hematology referral was denied for LAP as she didn't have a tissue diagnosis. .  Started a HCQ trial  6/2024 and it helped. We will continue HCQ for UCTD. Referred to ophthalmology normal baseline OCT 11/2024.  Reviewed labs 9/2024: CMP/ESR/CRP unremarkable.  CBC with WBC 4.2, normal diff. Hb 11.5. Will be monitored. RTC 3 months.    The assessment and plan, risk and benefits were discussed with the patient. All of the patients questions were answered and patient agrees to the plan.      Elia Lujan MD  Clinical   Department of Rheumatology   [facility]

## 2024-12-10 ENCOUNTER — APPOINTMENT (OUTPATIENT)
Dept: RHEUMATOLOGY | Facility: CLINIC | Age: 29
End: 2024-12-10
Payer: COMMERCIAL

## 2024-12-10 VITALS
BODY MASS INDEX: 23.73 KG/M2 | SYSTOLIC BLOOD PRESSURE: 115 MMHG | WEIGHT: 139 LBS | DIASTOLIC BLOOD PRESSURE: 81 MMHG | HEIGHT: 64 IN | HEART RATE: 98 BPM | TEMPERATURE: 98.1 F

## 2024-12-10 DIAGNOSIS — M35.9 UNDIFFERENTIATED CONNECTIVE TISSUE DISEASE (MULTI): Primary | ICD-10-CM

## 2024-12-10 DIAGNOSIS — M25.50 POLYARTHRALGIA: ICD-10-CM

## 2024-12-10 DIAGNOSIS — Z79.899 LONG-TERM USE OF HYDROXYCHLOROQUINE: ICD-10-CM

## 2024-12-10 PROCEDURE — 1036F TOBACCO NON-USER: CPT | Performed by: STUDENT IN AN ORGANIZED HEALTH CARE EDUCATION/TRAINING PROGRAM

## 2024-12-10 PROCEDURE — 3008F BODY MASS INDEX DOCD: CPT | Performed by: STUDENT IN AN ORGANIZED HEALTH CARE EDUCATION/TRAINING PROGRAM

## 2024-12-10 PROCEDURE — 99214 OFFICE O/P EST MOD 30 MIN: CPT | Performed by: STUDENT IN AN ORGANIZED HEALTH CARE EDUCATION/TRAINING PROGRAM

## 2024-12-10 RX ORDER — HYDROXYCHLOROQUINE SULFATE 200 MG/1
300 TABLET, FILM COATED ORAL DAILY
Qty: 45 TABLET | Refills: 2 | Status: SHIPPED | OUTPATIENT
Start: 2024-12-10

## 2025-03-01 LAB
ALBUMIN SERPL-MCNC: 4.7 G/DL (ref 3.6–5.1)
ALP SERPL-CCNC: 79 U/L (ref 31–125)
ALT SERPL-CCNC: 9 U/L (ref 6–29)
ANION GAP SERPL CALCULATED.4IONS-SCNC: 9 MMOL/L (CALC) (ref 7–17)
APPEARANCE UR: CLEAR
AST SERPL-CCNC: 16 U/L (ref 10–30)
BACTERIA #/AREA URNS HPF: ABNORMAL /HPF
BASOPHILS # BLD AUTO: 31 CELLS/UL (ref 0–200)
BASOPHILS NFR BLD AUTO: 0.5 %
BILIRUB SERPL-MCNC: 0.4 MG/DL (ref 0.2–1.2)
BILIRUB UR QL STRIP: NEGATIVE
BUN SERPL-MCNC: 10 MG/DL (ref 7–25)
C3 SERPL-MCNC: NORMAL MG/DL
C4 SERPL-MCNC: NORMAL MG/DL
CALCIUM SERPL-MCNC: 9.8 MG/DL (ref 8.6–10.2)
CHLORIDE SERPL-SCNC: 101 MMOL/L (ref 98–110)
CO2 SERPL-SCNC: 29 MMOL/L (ref 20–32)
COLOR UR: ABNORMAL
CREAT SERPL-MCNC: 0.53 MG/DL (ref 0.5–0.96)
CREAT UR-MCNC: 235 MG/DL (ref 20–275)
CRP SERPL-MCNC: NORMAL MG/L
DSDNA AB SER-ACNC: NORMAL [IU]/ML
EGFRCR SERPLBLD CKD-EPI 2021: 130 ML/MIN/1.73M2
EOSINOPHIL # BLD AUTO: 140 CELLS/UL (ref 15–500)
EOSINOPHIL NFR BLD AUTO: 2.3 %
ERYTHROCYTE [DISTWIDTH] IN BLOOD BY AUTOMATED COUNT: 11.7 % (ref 11–15)
ERYTHROCYTE [SEDIMENTATION RATE] IN BLOOD BY WESTERGREN METHOD: 28 MM/H
GLUCOSE SERPL-MCNC: 92 MG/DL (ref 65–99)
GLUCOSE UR QL STRIP: NEGATIVE
HCT VFR BLD AUTO: 41.7 % (ref 35–45)
HGB BLD-MCNC: 13.5 G/DL (ref 11.7–15.5)
HGB UR QL STRIP: NEGATIVE
HYALINE CASTS #/AREA URNS LPF: ABNORMAL /LPF
KETONES UR QL STRIP: NEGATIVE
LEUKOCYTE ESTERASE UR QL STRIP: NEGATIVE
LYMPHOCYTES # BLD AUTO: 1928 CELLS/UL (ref 850–3900)
LYMPHOCYTES NFR BLD AUTO: 31.6 %
MCH RBC QN AUTO: 30.1 PG (ref 27–33)
MCHC RBC AUTO-ENTMCNC: 32.4 G/DL (ref 32–36)
MCV RBC AUTO: 93.1 FL (ref 80–100)
MONOCYTES # BLD AUTO: 390 CELLS/UL (ref 200–950)
MONOCYTES NFR BLD AUTO: 6.4 %
NEUTROPHILS # BLD AUTO: 3611 CELLS/UL (ref 1500–7800)
NEUTROPHILS NFR BLD AUTO: 59.2 %
NITRITE UR QL STRIP: NEGATIVE
PH UR STRIP: 5.5 [PH] (ref 5–8)
PLATELET # BLD AUTO: 254 THOUSAND/UL (ref 140–400)
PMV BLD REES-ECKER: 9.9 FL (ref 7.5–12.5)
POTASSIUM SERPL-SCNC: 4 MMOL/L (ref 3.5–5.3)
PROT SERPL-MCNC: 7.6 G/DL (ref 6.1–8.1)
PROT UR QL STRIP: ABNORMAL
PROT UR-MCNC: 18 MG/DL (ref 5–24)
PROT/CREAT UR: 0.08 MG/MG CREAT (ref 0.02–0.18)
PROT/CREAT UR: 77 MG/G CREAT (ref 24–184)
RBC # BLD AUTO: 4.48 MILLION/UL (ref 3.8–5.1)
RBC #/AREA URNS HPF: ABNORMAL /HPF
SERVICE CMNT-IMP: ABNORMAL
SODIUM SERPL-SCNC: 139 MMOL/L (ref 135–146)
SP GR UR STRIP: 1.03 (ref 1–1.03)
SQUAMOUS #/AREA URNS HPF: ABNORMAL /HPF
WBC # BLD AUTO: 6.1 THOUSAND/UL (ref 3.8–10.8)
WBC #/AREA URNS HPF: ABNORMAL /HPF

## 2025-03-03 LAB
ALBUMIN SERPL-MCNC: 4.7 G/DL (ref 3.6–5.1)
ALP SERPL-CCNC: 79 U/L (ref 31–125)
ALT SERPL-CCNC: 9 U/L (ref 6–29)
ANION GAP SERPL CALCULATED.4IONS-SCNC: 9 MMOL/L (CALC) (ref 7–17)
APPEARANCE UR: CLEAR
AST SERPL-CCNC: 16 U/L (ref 10–30)
BACTERIA #/AREA URNS HPF: ABNORMAL /HPF
BASOPHILS # BLD AUTO: 31 CELLS/UL (ref 0–200)
BASOPHILS NFR BLD AUTO: 0.5 %
BILIRUB SERPL-MCNC: 0.4 MG/DL (ref 0.2–1.2)
BILIRUB UR QL STRIP: NEGATIVE
BUN SERPL-MCNC: 10 MG/DL (ref 7–25)
C3 SERPL-MCNC: 143 MG/DL (ref 83–193)
C4 SERPL-MCNC: 39 MG/DL (ref 15–57)
CALCIUM SERPL-MCNC: 9.8 MG/DL (ref 8.6–10.2)
CHLORIDE SERPL-SCNC: 101 MMOL/L (ref 98–110)
CO2 SERPL-SCNC: 29 MMOL/L (ref 20–32)
COLOR UR: ABNORMAL
CREAT SERPL-MCNC: 0.53 MG/DL (ref 0.5–0.96)
CREAT UR-MCNC: 235 MG/DL (ref 20–275)
CRP SERPL-MCNC: 14.8 MG/L
DSDNA AB SER-ACNC: <1 IU/ML
EGFRCR SERPLBLD CKD-EPI 2021: 130 ML/MIN/1.73M2
EOSINOPHIL # BLD AUTO: 140 CELLS/UL (ref 15–500)
EOSINOPHIL NFR BLD AUTO: 2.3 %
ERYTHROCYTE [DISTWIDTH] IN BLOOD BY AUTOMATED COUNT: 11.7 % (ref 11–15)
ERYTHROCYTE [SEDIMENTATION RATE] IN BLOOD BY WESTERGREN METHOD: 28 MM/H
GLUCOSE SERPL-MCNC: 92 MG/DL (ref 65–99)
GLUCOSE UR QL STRIP: NEGATIVE
HCT VFR BLD AUTO: 41.7 % (ref 35–45)
HGB BLD-MCNC: 13.5 G/DL (ref 11.7–15.5)
HGB UR QL STRIP: NEGATIVE
HYALINE CASTS #/AREA URNS LPF: ABNORMAL /LPF
KETONES UR QL STRIP: NEGATIVE
LEUKOCYTE ESTERASE UR QL STRIP: NEGATIVE
LYMPHOCYTES # BLD AUTO: 1928 CELLS/UL (ref 850–3900)
LYMPHOCYTES NFR BLD AUTO: 31.6 %
MCH RBC QN AUTO: 30.1 PG (ref 27–33)
MCHC RBC AUTO-ENTMCNC: 32.4 G/DL (ref 32–36)
MCV RBC AUTO: 93.1 FL (ref 80–100)
MONOCYTES # BLD AUTO: 390 CELLS/UL (ref 200–950)
MONOCYTES NFR BLD AUTO: 6.4 %
NEUTROPHILS # BLD AUTO: 3611 CELLS/UL (ref 1500–7800)
NEUTROPHILS NFR BLD AUTO: 59.2 %
NITRITE UR QL STRIP: NEGATIVE
PH UR STRIP: 5.5 [PH] (ref 5–8)
PLATELET # BLD AUTO: 254 THOUSAND/UL (ref 140–400)
PMV BLD REES-ECKER: 9.9 FL (ref 7.5–12.5)
POTASSIUM SERPL-SCNC: 4 MMOL/L (ref 3.5–5.3)
PROT SERPL-MCNC: 7.6 G/DL (ref 6.1–8.1)
PROT UR QL STRIP: ABNORMAL
PROT UR-MCNC: 18 MG/DL (ref 5–24)
PROT/CREAT UR: 0.08 MG/MG CREAT (ref 0.02–0.18)
PROT/CREAT UR: 77 MG/G CREAT (ref 24–184)
RBC # BLD AUTO: 4.48 MILLION/UL (ref 3.8–5.1)
RBC #/AREA URNS HPF: ABNORMAL /HPF
SERVICE CMNT-IMP: ABNORMAL
SODIUM SERPL-SCNC: 139 MMOL/L (ref 135–146)
SP GR UR STRIP: 1.03 (ref 1–1.03)
SQUAMOUS #/AREA URNS HPF: ABNORMAL /HPF
WBC # BLD AUTO: 6.1 THOUSAND/UL (ref 3.8–10.8)
WBC #/AREA URNS HPF: ABNORMAL /HPF

## 2025-03-04 NOTE — PROGRESS NOTES
Rheumatology Outpatient Follow Up Note    Subjective   Kiah Oliveros is a 29 y.o. female presenting today for Undifferentiated connective tissue disease.    History of Presenting Problem:   Rheum history:  She was referred to us by PCP for positive YVROSE. It was initially checked because she had recurrent episodes of fever (max ~100F ), fatigue, headache and joint pain(hands, wrists, shoulders, back, knees) but no swelling started in November 2022. Her episodes are not associated with skin rashes and no oral ulcers. No sore throat. No conjunctivitis. She also denies, malar rash, photosensitivity, skin rashes, dry eyes, dry mouth, seizures, h/o blood clots, h/o pericardial or pleural effusion, cold sensitivity in fingers, change in fingers colors when exposed to extreme temperatures, numbness, tingling or muscle weakness.  She has posterior cervical LAP that was first noted in 1/2023 and was seen by ENT who reassured her.   Reviewed labs 4/2023: YVROSE 1:160 speckled, CANDACE negative. RF negative. CBC with WBC 4.1 otherwise unremarkable. BMP normal in 4/2023     Interval history: she is on HCQ and she has less hand pain and stiffness in the morning, she has less fatigue.   No skin rashes. No oral ulcers and no fever episodes.    Past Medical History: No past medical history on file.    Allergies:   Allergies   Allergen Reactions    Doxycycline Fever    Lexapro [Escitalopram Oxalate] Fever       Medications:   Current Outpatient Medications:     hydroxychloroquine (Plaquenil) 200 mg tablet, Take 1.5 tablets (300 mg) by mouth once daily., Disp: 45 tablet, Rfl: 2    hydrOXYzine HCL (Atarax) 10 mg tablet, Take 1 tablet (10 mg) by mouth once daily at bedtime. (Patient not taking: Reported on 12/9/2024), Disp: , Rfl:     mirtazapine (Remeron) 7.5 mg tablet, Take 1 tablet (7.5 mg) by mouth once daily at bedtime. (Patient not taking: Reported on 12/9/2024), Disp: , Rfl:     Review of Systems:   All 10 review of systems have  "been reviewed and are negative for complaint except as noted in the HPI    Objective   Physical Examination:  Vitals:    03/05/25 0906   BP: 114/78   Pulse: 85   Temp: 36.3 °C (97.3 °F)             Growth %ile SmartLinks can only be used for patients less than 20 years old.  Ht Readings from Last 1 Encounters:   03/05/25 1.626 m (5' 4\")     Wt Readings from Last 1 Encounters:   03/05/25 58 kg (127 lb 12.8 oz)       General - NAD, sitting up in chair, well-groomed, pleasant, AAOx3  Head: Normocephalic, atraumatic  Eyes - PERRLA, EOMI. No conjunctiva injection.   Skin - No rashes or ulcers. Skin warm and dry. No erythema on bilateral cheeks.  Extremities - No edema, cyanosis ,or clubbing  Neurological - Alert and oriented x 3,  grossly intact. No focal deficit.  Musculoskeletal -  Shoulders: Full ROM, without pain, no swelling, warmth or tenderness.  Elbows: Full ROM, without pain, no swelling, warmth or tenderness.  Wrists: Full ROM, without pain, no swelling, warmth or tenderness.  MCP: No swelling, warmth or tenderness. Metacarpal squeeze negative  PIP: No swelling, warmth or tenderness.  DIP: No swelling, warmth or tenderness.  Hands : 5/5.    Sacroiliac joints: No local tenderness. XUNA negative.   Hips: Full ROM.  No malalignment.  Knees:  Full ROM, without pain, no swelling, warmth or point tenderness.   Ankles: Full ROM, without pain, swelling, warmth or tenderness.  Toes:  Metatarsal squeeze negative  Cervical spine: No tenderness or limitation of movement  Lumbar spine: No tenderness or limitation of movement          Assessment/Plan   Kiah Oliveros is a 29 y.o. female with PMHx of depression and cervical reactive lymphadenopathy who is presenting today for follow up:     ##UCTD:  -She had recurrent episodes of fever (max ~100F ), fatigue, headache and joint pain(hands, wrists, shoulders, back, knees) but no joint swelling. No sore throat and no mouth sores. She has 1 posterior cervical LAP but no " diffuse LAP. Her exam was normal. Her fevers were low grade and not associated with oral ulcers, sore throat, conjunctivitis, LAP or other features of autoinflammatory febrile illnesses.  -Reviewed labs 4/2023: YVROSE 1:160 speckled, CANDACE negative. RF negative. CBC with WBC 4.1 otherwise unremarkable. BMP normal. She had negative IgG4, pr/cr, APLA, ESR/CRP and IL-6  -Recent labs  2/2025: ESR 28 /CRP 14.8.  CBC/CMP/C3/C4/DsDNA/ urine pr/cr normal  -Hematology referral was denied for LAP as she didn't have a tissue diagnosis. .    -Started a HCQ trial  6/2024 and it helped. We will continue HCQ for UCTD.     ##HCQ long term use:  -Educated the patient about the potential side effects of using antimalarials. Serious side effects are extremely rare. However, the fear of vision-threatening toxic retinopathy remains a major concern, this can be avoided when using the appropriate doses along with routine ocular monitoring.  -Current dose is 300 mg daily (<5mg/kg/day)  -Referred to ophthalmology normal baseline OCT 11/2024.             The assessment and plan, risk and benefits were discussed with the patient. All of the patients questions were answered and patient agrees to the plan.      Elia Lujan MD  Clinical   Department of Rheumatology   [facility]

## 2025-03-05 ENCOUNTER — APPOINTMENT (OUTPATIENT)
Facility: CLINIC | Age: 30
End: 2025-03-05
Payer: COMMERCIAL

## 2025-03-05 VITALS
HEART RATE: 85 BPM | HEIGHT: 64 IN | WEIGHT: 127.8 LBS | SYSTOLIC BLOOD PRESSURE: 114 MMHG | BODY MASS INDEX: 21.82 KG/M2 | TEMPERATURE: 97.3 F | DIASTOLIC BLOOD PRESSURE: 78 MMHG

## 2025-03-05 DIAGNOSIS — R76.8 POSITIVE ANA (ANTINUCLEAR ANTIBODY): ICD-10-CM

## 2025-03-05 DIAGNOSIS — Z79.899 LONG-TERM USE OF HYDROXYCHLOROQUINE: ICD-10-CM

## 2025-03-05 DIAGNOSIS — M35.9 UNDIFFERENTIATED CONNECTIVE TISSUE DISEASE (MULTI): Primary | ICD-10-CM

## 2025-03-05 DIAGNOSIS — M25.50 POLYARTHRALGIA: ICD-10-CM

## 2025-03-05 DIAGNOSIS — R59.0 REACTIVE CERVICAL LYMPHADENOPATHY: ICD-10-CM

## 2025-03-05 PROCEDURE — 99214 OFFICE O/P EST MOD 30 MIN: CPT | Performed by: STUDENT IN AN ORGANIZED HEALTH CARE EDUCATION/TRAINING PROGRAM

## 2025-03-05 PROCEDURE — 3008F BODY MASS INDEX DOCD: CPT | Performed by: STUDENT IN AN ORGANIZED HEALTH CARE EDUCATION/TRAINING PROGRAM

## 2025-03-05 PROCEDURE — 1036F TOBACCO NON-USER: CPT | Performed by: STUDENT IN AN ORGANIZED HEALTH CARE EDUCATION/TRAINING PROGRAM

## 2025-03-05 RX ORDER — HYDROXYCHLOROQUINE SULFATE 200 MG/1
300 TABLET, FILM COATED ORAL DAILY
Qty: 45 TABLET | Refills: 2 | Status: SHIPPED | OUTPATIENT
Start: 2025-03-05

## 2025-06-03 LAB
ALBUMIN SERPL-MCNC: 4.5 G/DL (ref 3.6–5.1)
ALP SERPL-CCNC: 79 U/L (ref 31–125)
ALT SERPL-CCNC: 13 U/L (ref 6–29)
ANION GAP SERPL CALCULATED.4IONS-SCNC: 7 MMOL/L (CALC) (ref 7–17)
APPEARANCE UR: CLEAR
AST SERPL-CCNC: 16 U/L (ref 10–30)
BACTERIA #/AREA URNS HPF: ABNORMAL /HPF
BASOPHILS # BLD AUTO: 41 CELLS/UL (ref 0–200)
BASOPHILS NFR BLD AUTO: 0.8 %
BILIRUB SERPL-MCNC: 0.6 MG/DL (ref 0.2–1.2)
BILIRUB UR QL STRIP: NEGATIVE
BUN SERPL-MCNC: 10 MG/DL (ref 7–25)
C3 SERPL-MCNC: 138 MG/DL (ref 83–193)
C4 SERPL-MCNC: 33 MG/DL (ref 15–57)
CALCIUM SERPL-MCNC: 9.3 MG/DL (ref 8.6–10.2)
CAOX CRY #/AREA URNS HPF: ABNORMAL /HPF
CHLORIDE SERPL-SCNC: 101 MMOL/L (ref 98–110)
CO2 SERPL-SCNC: 28 MMOL/L (ref 20–32)
COLOR UR: YELLOW
CREAT SERPL-MCNC: 0.56 MG/DL (ref 0.5–0.96)
CREAT UR-MCNC: 168 MG/DL (ref 20–275)
CRP SERPL-MCNC: <3 MG/L
DSDNA AB SER-ACNC: <1 IU/ML
EGFRCR SERPLBLD CKD-EPI 2021: 127 ML/MIN/1.73M2
EOSINOPHIL # BLD AUTO: 20 CELLS/UL (ref 15–500)
EOSINOPHIL NFR BLD AUTO: 0.4 %
ERYTHROCYTE [DISTWIDTH] IN BLOOD BY AUTOMATED COUNT: 14.4 % (ref 11–15)
ERYTHROCYTE [SEDIMENTATION RATE] IN BLOOD BY WESTERGREN METHOD: 9 MM/H
GLUCOSE SERPL-MCNC: 86 MG/DL (ref 65–99)
GLUCOSE UR QL STRIP: NEGATIVE
HCT VFR BLD AUTO: 39.6 % (ref 35–45)
HGB BLD-MCNC: 12.6 G/DL (ref 11.7–15.5)
HGB UR QL STRIP: NEGATIVE
HYALINE CASTS #/AREA URNS LPF: ABNORMAL /LPF
KETONES UR QL STRIP: NEGATIVE
LEUKOCYTE ESTERASE UR QL STRIP: ABNORMAL
LYMPHOCYTES # BLD AUTO: 2015 CELLS/UL (ref 850–3900)
LYMPHOCYTES NFR BLD AUTO: 39.5 %
MCH RBC QN AUTO: 29.2 PG (ref 27–33)
MCHC RBC AUTO-ENTMCNC: 31.8 G/DL (ref 32–36)
MCV RBC AUTO: 91.7 FL (ref 80–100)
MONOCYTES # BLD AUTO: 321 CELLS/UL (ref 200–950)
MONOCYTES NFR BLD AUTO: 6.3 %
NEUTROPHILS # BLD AUTO: 2703 CELLS/UL (ref 1500–7800)
NEUTROPHILS NFR BLD AUTO: 53 %
NITRITE UR QL STRIP: NEGATIVE
PH UR STRIP: 5.5 [PH] (ref 5–8)
PLATELET # BLD AUTO: 247 THOUSAND/UL (ref 140–400)
PMV BLD REES-ECKER: 9.6 FL (ref 7.5–12.5)
POTASSIUM SERPL-SCNC: 3.8 MMOL/L (ref 3.5–5.3)
PROT SERPL-MCNC: 7.1 G/DL (ref 6.1–8.1)
PROT UR QL STRIP: ABNORMAL
PROT UR-MCNC: 16 MG/DL (ref 5–24)
PROT/CREAT UR: 0.1 MG/MG CREAT (ref 0.02–0.18)
PROT/CREAT UR: 95 MG/G CREAT (ref 24–184)
RBC # BLD AUTO: 4.32 MILLION/UL (ref 3.8–5.1)
RBC #/AREA URNS HPF: ABNORMAL /HPF
SERVICE CMNT-IMP: ABNORMAL
SODIUM SERPL-SCNC: 136 MMOL/L (ref 135–146)
SP GR UR STRIP: 1.03 (ref 1–1.03)
SQUAMOUS #/AREA URNS HPF: ABNORMAL /HPF
WBC # BLD AUTO: 5.1 THOUSAND/UL (ref 3.8–10.8)
WBC #/AREA URNS HPF: ABNORMAL /HPF

## 2025-06-04 ENCOUNTER — APPOINTMENT (OUTPATIENT)
Facility: CLINIC | Age: 30
End: 2025-06-04
Payer: COMMERCIAL

## 2025-06-04 VITALS
WEIGHT: 120.4 LBS | HEIGHT: 64 IN | DIASTOLIC BLOOD PRESSURE: 86 MMHG | TEMPERATURE: 97.5 F | HEART RATE: 98 BPM | BODY MASS INDEX: 20.55 KG/M2 | SYSTOLIC BLOOD PRESSURE: 127 MMHG

## 2025-06-04 DIAGNOSIS — M35.9 UNDIFFERENTIATED CONNECTIVE TISSUE DISEASE (MULTI): ICD-10-CM

## 2025-06-04 DIAGNOSIS — M25.50 POLYARTHRALGIA: ICD-10-CM

## 2025-06-04 DIAGNOSIS — Z79.899 LONG-TERM USE OF HYDROXYCHLOROQUINE: ICD-10-CM

## 2025-06-04 PROCEDURE — 1036F TOBACCO NON-USER: CPT | Performed by: STUDENT IN AN ORGANIZED HEALTH CARE EDUCATION/TRAINING PROGRAM

## 2025-06-04 PROCEDURE — 3008F BODY MASS INDEX DOCD: CPT | Performed by: STUDENT IN AN ORGANIZED HEALTH CARE EDUCATION/TRAINING PROGRAM

## 2025-06-04 PROCEDURE — 99214 OFFICE O/P EST MOD 30 MIN: CPT | Performed by: STUDENT IN AN ORGANIZED HEALTH CARE EDUCATION/TRAINING PROGRAM

## 2025-06-04 RX ORDER — HYDROXYCHLOROQUINE SULFATE 200 MG/1
300 TABLET, FILM COATED ORAL DAILY
Qty: 45 TABLET | Refills: 2 | Status: SHIPPED | OUTPATIENT
Start: 2025-06-04

## 2025-06-04 NOTE — PROGRESS NOTES
Rheumatology Outpatient Follow Up Note    Subjective   Kiah Oliveros is a 30 y.o. female presenting today for Joint Pain.    History of Presenting Problem:   Rheum history:  She was referred to us by PCP for positive YVROSE. It was initially checked because she had recurrent episodes of fever (max ~100F ), fatigue, headache and joint pain(hands, wrists, shoulders, back, knees) but no swelling started in November 2022. Her episodes are not associated with skin rashes and no oral ulcers. No sore throat. No conjunctivitis. She also denies, malar rash, photosensitivity, skin rashes, dry eyes, dry mouth, seizures, h/o blood clots, h/o pericardial or pleural effusion, cold sensitivity in fingers, change in fingers colors when exposed to extreme temperatures, numbness, tingling or muscle weakness.  She has posterior cervical LAP that was first noted in 1/2023 and was seen by ENT who reassured her.   Reviewed labs 4/2023: YVROSE 1:160 speckled, CANDACE negative. RF negative. CBC with WBC 4.1 otherwise unremarkable. BMP normal in 4/2023     Interval history: she is on HCQ and she has no hand pain or stiffness in the morning, she has less fatigue.   No skin rashes. No oral ulcers and no fever episodes. She has red left eye today that she thinks it was an irritation from the shampoo    Past Medical History: History reviewed. No pertinent past medical history.    Allergies:   Allergies   Allergen Reactions    Doxycycline Fever    Lexapro [Escitalopram Oxalate] Fever       Medications:   Current Outpatient Medications:     hydroxychloroquine (Plaquenil) 200 mg tablet, Take 1.5 tablets (300 mg) by mouth once daily., Disp: 45 tablet, Rfl: 2    hydrOXYzine HCL (Atarax) 10 mg tablet, Take 1 tablet (10 mg) by mouth once daily at bedtime. (Patient not taking: Reported on 12/9/2024), Disp: , Rfl:     mirtazapine (Remeron) 7.5 mg tablet, Take 1 tablet (7.5 mg) by mouth once daily at bedtime. (Patient not taking: Reported on 12/9/2024), Disp:  ", Rfl:     Review of Systems:   All 10 review of systems have been reviewed and are negative for complaint except as noted in the HPI    Objective   Physical Examination:  Vitals:    06/04/25 0938   BP: 127/86   Pulse: 98   Temp: 36.4 °C (97.5 °F)               Growth %ile SmartLinks can only be used for patients less than 20 years old.  Ht Readings from Last 1 Encounters:   06/04/25 1.626 m (5' 4\")     Wt Readings from Last 1 Encounters:   06/04/25 54.6 kg (120 lb 6.4 oz)       General - NAD, sitting up in chair, well-groomed, pleasant, AAOx3  Head: Normocephalic, atraumatic  Eyes - PERRLA, EOMI. Left eye conjunctiva injection.   Skin - No rashes or ulcers. Skin warm and dry. No erythema on bilateral cheeks.  Extremities - No edema, cyanosis ,or clubbing  Neurological - Alert and oriented x 3,  grossly intact. No focal deficit.  Musculoskeletal -  Shoulders: Full ROM, without pain, no swelling, warmth or tenderness.  Elbows: Full ROM, without pain, no swelling, warmth or tenderness.  Wrists: Full ROM, without pain, no swelling, warmth or tenderness.  MCP: No swelling, warmth or tenderness. Metacarpal squeeze negative  PIP: No swelling, warmth or tenderness.  DIP: No swelling, warmth or tenderness.  Hands : 5/5.    Sacroiliac joints: No local tenderness. XUAN negative.   Hips: Full ROM.  No malalignment.  Knees:  Full ROM, without pain, no swelling, warmth or point tenderness.   Ankles: Full ROM, without pain, swelling, warmth or tenderness.  Toes:  Metatarsal squeeze negative  Cervical spine: No tenderness or limitation of movement  Lumbar spine: No tenderness or limitation of movement          Assessment/Plan   Kiah Oliveros is a 30 y.o. female with PMHx of depression and cervical reactive lymphadenopathy who is presenting today for follow up:     ##UCTD:  -She had recurrent episodes of fever (max ~100F ), fatigue, headache and joint pain(hands, wrists, shoulders, back, knees) but no joint swelling. No " sore throat and no mouth sores. She has 1 posterior cervical LAP but no diffuse LAP.  Her fevers were low grade and not associated with oral ulcers, sore throat, conjunctivitis, LAP or other features of autoinflammatory febrile illnesses.  -Reviewed labs 4/2023: YVROSE 1:160 speckled, CANDACE negative. RF negative. CBC with WBC 4.1 otherwise unremarkable. BMP normal. She had negative IgG4, pr/cr, APLA, ESR/CRP and IL-6  -Recent labs  6/2025: ESR/CRP/CBC/CMP/C3/C4/DsDNA/ urine pr/cr normal  -Hematology referral was denied for LAP as she didn't have a tissue diagnosis. .    -Started a HCQ trial  6/2024 and it helped. We will continue HCQ for UCTD.     ##Left red eye: she thinks its a chemical irritation, advised to see ophtho if no improvement in few hours.     ##HCQ long term use:  -Educated the patient about the potential side effects of using antimalarials. Serious side effects are extremely rare. However, the fear of vision-threatening toxic retinopathy remains a major concern, this can be avoided when using the appropriate doses along with routine ocular monitoring.  -Current dose is 300 mg daily (<5mg/kg/day)  -Referred to ophthalmology normal baseline OCT 11/2024.             The assessment and plan, risk and benefits were discussed with the patient. All of the patients questions were answered and patient agrees to the plan.      Elia Lujan MD  Clinical   Department of Rheumatology   [facility]

## 2025-08-14 ENCOUNTER — OFFICE VISIT (OUTPATIENT)
Dept: PRIMARY CARE | Facility: CLINIC | Age: 30
End: 2025-08-14
Payer: COMMERCIAL

## 2025-08-14 VITALS
SYSTOLIC BLOOD PRESSURE: 122 MMHG | TEMPERATURE: 97.5 F | BODY MASS INDEX: 19.97 KG/M2 | HEIGHT: 64 IN | WEIGHT: 117 LBS | HEART RATE: 127 BPM | DIASTOLIC BLOOD PRESSURE: 76 MMHG | OXYGEN SATURATION: 97 %

## 2025-08-14 DIAGNOSIS — R00.0 TACHYCARDIA: ICD-10-CM
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
## 2025-08-14 DIAGNOSIS — F41.9 ANXIETY: ICD-10-CM

## 2025-08-14 RX ORDER — METHENAMINE HIPPURATE 1000 MG/1
1 TABLET ORAL
COMMUNITY
Start: 2025-08-04

## 2025-08-14 ASSESSMENT — PAIN SCALES - GENERAL: PAINLEVEL_OUTOF10: 0-NO PAIN

## 2025-08-14 ASSESSMENT — PATIENT HEALTH QUESTIONNAIRE - PHQ9
SUM OF ALL RESPONSES TO PHQ9 QUESTIONS 1 AND 2: 0
1. LITTLE INTEREST OR PLEASURE IN DOING THINGS: NOT AT ALL
2. FEELING DOWN, DEPRESSED OR HOPELESS: NOT AT ALL

## 2025-08-20 ENCOUNTER — APPOINTMENT (OUTPATIENT)
Dept: RADIOLOGY | Facility: HOSPITAL | Age: 30
End: 2025-08-20
Payer: COMMERCIAL

## 2025-08-21 ENCOUNTER — APPOINTMENT (OUTPATIENT)
Dept: PRIMARY CARE | Facility: CLINIC | Age: 30
End: 2025-08-21
Payer: COMMERCIAL

## 2025-08-26 ENCOUNTER — APPOINTMENT (OUTPATIENT)
Dept: PRIMARY CARE | Facility: CLINIC | Age: 30
End: 2025-08-26
Payer: COMMERCIAL

## 2025-08-26 VITALS
WEIGHT: 124.8 LBS | SYSTOLIC BLOOD PRESSURE: 121 MMHG | TEMPERATURE: 97.9 F | DIASTOLIC BLOOD PRESSURE: 88 MMHG | OXYGEN SATURATION: 100 % | HEART RATE: 102 BPM | BODY MASS INDEX: 21.42 KG/M2
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
## 2025-08-26 DIAGNOSIS — R00.0 TACHYCARDIA: Primary | ICD-10-CM

## 2025-08-26 DIAGNOSIS — F41.9 ANXIETY: ICD-10-CM

## 2025-08-26 LAB
ALBUMIN SERPL-MCNC: 3.6 G/DL (ref 3.6–5.1)
ALP SERPL-CCNC: 60 U/L (ref 31–125)
ALT SERPL-CCNC: 9 U/L (ref 6–29)
ANION GAP SERPL CALCULATED.4IONS-SCNC: 8 MMOL/L (CALC) (ref 7–17)
AST SERPL-CCNC: 16 U/L (ref 10–30)
B-HCG UR QL: NEGATIVE
BASOPHILS # BLD AUTO: 28 CELLS/UL (ref 0–200)
BASOPHILS NFR BLD AUTO: 0.5 %
BILIRUB SERPL-MCNC: 0.3 MG/DL (ref 0.2–1.2)
BUN SERPL-MCNC: 10 MG/DL (ref 7–25)
CALCIUM SERPL-MCNC: 9 MG/DL (ref 8.6–10.2)
CHLORIDE SERPL-SCNC: 103 MMOL/L (ref 98–110)
CO2 SERPL-SCNC: 29 MMOL/L (ref 20–32)
CREAT SERPL-MCNC: 0.58 MG/DL (ref 0.5–0.96)
EGFRCR SERPLBLD CKD-EPI 2021: 126 ML/MIN/1.73M2
EOSINOPHIL # BLD AUTO: 213 CELLS/UL (ref 15–500)
EOSINOPHIL NFR BLD AUTO: 3.8 %
ERYTHROCYTE [DISTWIDTH] IN BLOOD BY AUTOMATED COUNT: 13.6 % (ref 11–15)
GLUCOSE SERPL-MCNC: 84 MG/DL (ref 65–99)
HCT VFR BLD AUTO: 37.4 % (ref 35–45)
HGB BLD-MCNC: 12.1 G/DL (ref 11.7–15.5)
LYMPHOCYTES # BLD AUTO: 2268 CELLS/UL (ref 850–3900)
LYMPHOCYTES NFR BLD AUTO: 40.5 %
MCH RBC QN AUTO: 30.4 PG (ref 27–33)
MCHC RBC AUTO-ENTMCNC: 32.4 G/DL (ref 32–36)
MCV RBC AUTO: 94 FL (ref 80–100)
MONOCYTES # BLD AUTO: 442 CELLS/UL (ref 200–950)
MONOCYTES NFR BLD AUTO: 7.9 %
NEUTROPHILS # BLD AUTO: 2649 CELLS/UL (ref 1500–7800)
NEUTROPHILS NFR BLD AUTO: 47.3 %
PLATELET # BLD AUTO: 234 THOUSAND/UL (ref 140–400)
PMV BLD REES-ECKER: 9.3 FL (ref 7.5–12.5)
POTASSIUM SERPL-SCNC: 4.6 MMOL/L (ref 3.5–5.3)
PROT SERPL-MCNC: 5.8 G/DL (ref 6.1–8.1)
RBC # BLD AUTO: 3.98 MILLION/UL (ref 3.8–5.1)
SODIUM SERPL-SCNC: 140 MMOL/L (ref 135–146)
TSH SERPL-ACNC: 0.9 MIU/L
WBC # BLD AUTO: 5.6 THOUSAND/UL (ref 3.8–10.8)

## 2025-08-26 PROCEDURE — 99214 OFFICE O/P EST MOD 30 MIN: CPT

## 2025-08-26 PROCEDURE — 1036F TOBACCO NON-USER: CPT

## 2025-08-26 RX ORDER — HYDROXYZINE HYDROCHLORIDE 25 MG/1
25 TABLET, FILM COATED ORAL EVERY 8 HOURS PRN
Qty: 60 TABLET | Refills: 0 | Status: SHIPPED | OUTPATIENT
Start: 2025-08-26 | End: 2025-08-28 | Stop reason: WASHOUT

## 2025-08-26 ASSESSMENT — PATIENT HEALTH QUESTIONNAIRE - PHQ9
2. FEELING DOWN, DEPRESSED OR HOPELESS: NOT AT ALL
1. LITTLE INTEREST OR PLEASURE IN DOING THINGS: NOT AT ALL
SUM OF ALL RESPONSES TO PHQ9 QUESTIONS 1 AND 2: 0

## 2025-08-28 DIAGNOSIS — F41.9 ANXIETY: Primary | ICD-10-CM

## 2025-08-28 RX ORDER — BUSPIRONE HYDROCHLORIDE 5 MG/1
5 TABLET ORAL 2 TIMES DAILY
Qty: 60 TABLET | Refills: 11 | Status: SHIPPED | OUTPATIENT
Start: 2025-08-28 | End: 2026-08-28

## 2025-09-10 ENCOUNTER — APPOINTMENT (OUTPATIENT)
Facility: CLINIC | Age: 30
End: 2025-09-10
Payer: COMMERCIAL